# Patient Record
Sex: FEMALE | HISPANIC OR LATINO | URBAN - NONMETROPOLITAN AREA
[De-identification: names, ages, dates, MRNs, and addresses within clinical notes are randomized per-mention and may not be internally consistent; named-entity substitution may affect disease eponyms.]

---

## 2019-01-11 ENCOUNTER — OFFICE VISIT (OUTPATIENT)
Dept: URBAN - NONMETROPOLITAN AREA CLINIC 18 | Facility: CLINIC | Age: 39
End: 2019-01-11
Payer: COMMERCIAL

## 2019-01-11 PROCEDURE — 92014 COMPRE OPH EXAM EST PT 1/>: CPT | Performed by: OPTOMETRIST

## 2019-01-11 PROCEDURE — 92015 DETERMINE REFRACTIVE STATE: CPT | Performed by: OPTOMETRIST

## 2019-01-11 ASSESSMENT — VISUAL ACUITY
OD: 20/20
OS: 20/20

## 2019-01-11 ASSESSMENT — INTRAOCULAR PRESSURE
OD: 13
OS: 13

## 2019-01-11 NOTE — IMPRESSION/PLAN
Impression: Diagnosis: Round hole, bilateral. Code: O17.261. Plan: Holes in lattice observed. Multiple holes in a line observed OS, floaters in vision, rare photopsia reported. Stable retina observed today. Limited symptoms, longstanding. Education of signs and symptoms of retinal detachment reviewed. RTC immediately if experienced. Patient interested in referral, RTC to Dr. Remington Alejandro for potential laser treatment.

## 2019-01-11 NOTE — IMPRESSION/PLAN
Impression: Lattice degeneration of retina, bilateral: H35.413. Plan: A thorough review of the ocular findings was discussed. Signs and symptoms of retinal detachment reviewed in detail, rtc immediately if experienced.

## 2019-04-23 ENCOUNTER — OFFICE VISIT (OUTPATIENT)
Dept: URBAN - METROPOLITAN AREA CLINIC 88 | Facility: CLINIC | Age: 39
End: 2019-04-23
Payer: MEDICAID

## 2019-04-23 DIAGNOSIS — H35.411 LATTICE DEGENERATION OF RETINA, RIGHT EYE: Primary | ICD-10-CM

## 2019-04-23 PROCEDURE — 67145 PROPH RTA DTCHMNT PC: CPT | Performed by: OPHTHALMOLOGY

## 2019-04-23 ASSESSMENT — INTRAOCULAR PRESSURE
OD: 18
OS: 18

## 2019-05-15 ENCOUNTER — OFFICE VISIT (OUTPATIENT)
Dept: URBAN - METROPOLITAN AREA CLINIC 88 | Facility: CLINIC | Age: 39
End: 2019-05-15
Payer: MEDICAID

## 2019-05-15 DIAGNOSIS — H35.412 LATTICE DEGENERATION OF RETINA, LEFT EYE: Primary | ICD-10-CM

## 2019-05-15 PROCEDURE — 67145 PROPH RTA DTCHMNT PC: CPT | Performed by: OPHTHALMOLOGY

## 2019-05-15 ASSESSMENT — INTRAOCULAR PRESSURE
OD: 13
OS: 13

## 2019-07-22 ENCOUNTER — OFFICE VISIT (OUTPATIENT)
Dept: URBAN - METROPOLITAN AREA CLINIC 88 | Facility: CLINIC | Age: 39
End: 2019-07-22
Payer: MEDICAID

## 2019-07-22 DIAGNOSIS — H35.413 LATTICE DEGENERATION OF RETINA, BILATERAL: Primary | ICD-10-CM

## 2019-07-22 PROCEDURE — 99024 POSTOP FOLLOW-UP VISIT: CPT | Performed by: OPHTHALMOLOGY

## 2019-07-22 PROCEDURE — 99213 OFFICE O/P EST LOW 20 MIN: CPT | Performed by: OPHTHALMOLOGY

## 2019-07-22 ASSESSMENT — INTRAOCULAR PRESSURE
OS: 17
OD: 17

## 2019-07-22 NOTE — IMPRESSION/PLAN
Impression: Lattice degeneration of retina, bilateral: H35.413. Plan: Advised patient of condition. Discussed signs and symptoms of PVD/floaters.

## 2019-07-22 NOTE — IMPRESSION/PLAN
Impression: Round hole, bilateral: H33.323 OU. Condition: unstable. Plan: Discussed diagnosis in detail with patient. S/P Retina Laser OU. I would recommend Laser treatment ,risks and benefits were discussed, explained and understood by patient. Patient would like to proceed with recommendation.

## 2019-08-05 ENCOUNTER — PROCEDURE (OUTPATIENT)
Dept: URBAN - METROPOLITAN AREA CLINIC 88 | Facility: CLINIC | Age: 39
End: 2019-08-05
Payer: MEDICAID

## 2019-08-05 DIAGNOSIS — H33.323 ROUND HOLE, BILATERAL: Primary | ICD-10-CM

## 2019-08-05 PROCEDURE — 67228 TREATMENT X10SV RETINOPATHY: CPT | Performed by: OPHTHALMOLOGY

## 2019-08-05 ASSESSMENT — INTRAOCULAR PRESSURE
OD: 16
OS: 16

## 2019-10-01 ENCOUNTER — OFFICE VISIT (OUTPATIENT)
Dept: URBAN - METROPOLITAN AREA CLINIC 88 | Facility: CLINIC | Age: 39
End: 2019-10-01
Payer: MEDICAID

## 2019-10-01 DIAGNOSIS — H43.393 OTHER VITREOUS OPACITIES, BILATERAL: Primary | ICD-10-CM

## 2019-10-01 DIAGNOSIS — H10.413 CONJUNCTIVITIS - ALLERGIC: ICD-10-CM

## 2019-10-01 PROCEDURE — 99214 OFFICE O/P EST MOD 30 MIN: CPT | Performed by: OPHTHALMOLOGY

## 2019-10-01 ASSESSMENT — INTRAOCULAR PRESSURE
OD: 13
OS: 14

## 2019-10-01 NOTE — IMPRESSION/PLAN
Impression: Conjunctivitis - Allergic: H10.413. Plan: Discussed diagnosis in detail with patient. Patient is taking Claritin for her allergies. Patient instructed to apply cool compresses.

## 2019-10-01 NOTE — IMPRESSION/PLAN
Impression: Other vitreous opacities, bilateral: H43.393. Condition: established, stable. Plan: S/P Laser tx 08/05/19 OU. Discussed signs and symptoms of PVD/floaters. Patient instructed to call the office immediately if any symptoms noted.

## 2022-03-16 ENCOUNTER — OFFICE VISIT (OUTPATIENT)
Dept: URBAN - NONMETROPOLITAN AREA CLINIC 18 | Facility: CLINIC | Age: 42
End: 2022-03-16
Payer: COMMERCIAL

## 2022-03-16 DIAGNOSIS — H33.321 ROUND HOLE, RIGHT EYE: ICD-10-CM

## 2022-03-16 DIAGNOSIS — H52.13 MYOPIA, BILATERAL: Primary | ICD-10-CM

## 2022-03-16 DIAGNOSIS — H25.13 AGE-RELATED NUCLEAR CATARACT, BILATERAL: ICD-10-CM

## 2022-03-16 PROCEDURE — 92004 COMPRE OPH EXAM NEW PT 1/>: CPT | Performed by: OPTOMETRIST

## 2022-03-16 ASSESSMENT — VISUAL ACUITY
OS: 20/20
OD: 20/20

## 2022-03-16 ASSESSMENT — INTRAOCULAR PRESSURE
OS: 14
OD: 14

## 2022-03-16 NOTE — IMPRESSION/PLAN
Impression: Round hole, right eye: H33.321. Plan: Multiple holes with laser surround. 1 new hole temp OD. Education of signs and symptoms of retinal detachment reviewed. RTC immediately if experienced. Will refer to Dr. Iain Montemayor for additional treatment.

## 2022-03-16 NOTE — IMPRESSION/PLAN
Impression: Myopia, bilateral: H52.13. Plan: Reviewed refractive prescription in detail with patient and ways glasses can be used  to improve vision. Release spectacle prescription at this time.

## 2022-03-30 ENCOUNTER — OFFICE VISIT (OUTPATIENT)
Dept: URBAN - METROPOLITAN AREA CLINIC 88 | Facility: CLINIC | Age: 42
End: 2022-03-30
Payer: MEDICAID

## 2022-03-30 PROCEDURE — 67228 TREATMENT X10SV RETINOPATHY: CPT | Performed by: OPHTHALMOLOGY

## 2022-03-30 PROCEDURE — 92012 INTRM OPH EXAM EST PATIENT: CPT | Performed by: OPHTHALMOLOGY

## 2022-03-30 ASSESSMENT — INTRAOCULAR PRESSURE
OS: 15
OD: 14

## 2022-03-30 NOTE — IMPRESSION/PLAN
Impression: Round hole, right eye: H33.321. Plan: All signs/symptoms and risks of retinal detachment and tears were discussed in detail. Recommend Laser OD today due to patient lives out of town. Risks/benefits were discussed. I do recommend patient have a follow up at [facility] office closer to patient.

## 2022-05-24 ENCOUNTER — OFFICE VISIT (OUTPATIENT)
Dept: URBAN - NONMETROPOLITAN AREA CLINIC 18 | Facility: CLINIC | Age: 42
End: 2022-05-24
Payer: MEDICAID

## 2022-05-24 DIAGNOSIS — H25.13 AGE-RELATED NUCLEAR CATARACT, BILATERAL: ICD-10-CM

## 2022-05-24 DIAGNOSIS — H33.321 ROUND HOLE, RIGHT EYE: ICD-10-CM

## 2022-05-24 DIAGNOSIS — H43.393 OTHER VITREOUS OPACITIES, BILATERAL: ICD-10-CM

## 2022-05-24 DIAGNOSIS — H10.413 CONJUNCTIVITIS - ALLERGIC: Primary | ICD-10-CM

## 2022-05-24 DIAGNOSIS — H33.323 ROUND HOLE, BILATERAL: ICD-10-CM

## 2022-05-24 PROCEDURE — 99213 OFFICE O/P EST LOW 20 MIN: CPT | Performed by: OPTOMETRIST

## 2022-05-24 ASSESSMENT — INTRAOCULAR PRESSURE
OD: 15
OS: 15

## 2022-05-24 NOTE — IMPRESSION/PLAN
Impression: Round hole, right eye: H33.321. Plan: S/P laser tx. Monitor. All signs/symptoms and risks of retinal detachment and tears were discussed in detail. Patient instructed to call office immediately if any symptoms noted.

## 2022-08-31 ENCOUNTER — OFFICE VISIT (OUTPATIENT)
Dept: URBAN - NONMETROPOLITAN AREA CLINIC 18 | Facility: CLINIC | Age: 42
End: 2022-08-31

## 2022-08-31 DIAGNOSIS — H52.13 MYOPIA, BILATERAL: Primary | ICD-10-CM

## 2022-08-31 PROCEDURE — 92310 CONTACT LENS FITTING OU: CPT | Performed by: OPTOMETRIST

## 2022-09-15 ENCOUNTER — OFFICE VISIT (OUTPATIENT)
Dept: URBAN - NONMETROPOLITAN AREA CLINIC 18 | Facility: CLINIC | Age: 42
End: 2022-09-15

## 2022-09-15 DIAGNOSIS — H52.13 MYOPIA, BILATERAL: Primary | ICD-10-CM

## 2022-09-15 PROCEDURE — 92310 CONTACT LENS FITTING OU: CPT | Performed by: OPTOMETRIST

## 2022-10-13 ENCOUNTER — OFFICE VISIT (OUTPATIENT)
Dept: URBAN - NONMETROPOLITAN AREA CLINIC 18 | Facility: CLINIC | Age: 42
End: 2022-10-13
Payer: MEDICAID

## 2022-10-13 DIAGNOSIS — H18.829 CORNEAL DISORDER DUE TO CONTACT LENS: Primary | ICD-10-CM

## 2022-10-13 PROCEDURE — 99213 OFFICE O/P EST LOW 20 MIN: CPT | Performed by: OPTOMETRIST

## 2022-10-13 RX ORDER — PREDNISOLONE ACETATE 10 MG/ML
1 % SUSPENSION/ DROPS OPHTHALMIC
Qty: 5 | Refills: 0 | Status: ACTIVE
Start: 2022-10-13

## 2022-10-13 ASSESSMENT — INTRAOCULAR PRESSURE
OD: 10
OS: 10

## 2022-10-13 NOTE — IMPRESSION/PLAN
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 Plan: Discussed status with patient. Will start Pred 1% BID OU x 1 week then QD OU x 1 week. Cool compresses and d/c cls x 1 week. Switch cl solution to clear care.

## 2022-12-22 ENCOUNTER — OFFICE VISIT (OUTPATIENT)
Dept: URBAN - NONMETROPOLITAN AREA CLINIC 18 | Facility: CLINIC | Age: 42
End: 2022-12-22
Payer: MEDICAID

## 2022-12-22 DIAGNOSIS — H10.413 CONJUNCTIVITIS - ALLERGIC: Primary | ICD-10-CM

## 2022-12-22 PROCEDURE — 99213 OFFICE O/P EST LOW 20 MIN: CPT | Performed by: OPTOMETRIST

## 2022-12-22 RX ORDER — FLUOROMETHOLONE 2.5 MG/ML
0.25 % SUSPENSION/ DROPS OPHTHALMIC
Qty: 5 | Refills: 0 | Status: ACTIVE
Start: 2022-12-22

## 2022-12-22 NOTE — IMPRESSION/PLAN
Impression: Conjunctivitis - Allergic: H10.413. Plan: Patient educated that symptoms of ocular irritation are likely caused by allergies. Recommend igtt Pataday QD OU and cool compresses to improve symptoms. Will rx FML BID OU x 1 week, QD OU x 1 week. Preservative Free Optive deja 3 or Systane Complete TID-QID OU. Patient understands that medication will not cure allergies and is useful in reducing symptoms. RTC if symptoms do not improve or if new symptoms are experienced.

## 2023-03-28 ENCOUNTER — OFFICE VISIT (OUTPATIENT)
Dept: URBAN - NONMETROPOLITAN AREA CLINIC 18 | Facility: CLINIC | Age: 43
End: 2023-03-28
Payer: MEDICAID

## 2023-03-28 DIAGNOSIS — H10.413 CONJUNCTIVITIS - ALLERGIC: Primary | ICD-10-CM

## 2023-03-28 DIAGNOSIS — H18.829 CORNEAL DISORDER DUE TO CONTACT LENS: ICD-10-CM

## 2023-03-28 PROCEDURE — 99213 OFFICE O/P EST LOW 20 MIN: CPT | Performed by: OPTOMETRIST

## 2023-03-28 NOTE — IMPRESSION/PLAN
Impression: Conjunctivitis - Allergic: H10.413. Plan: Improved status. Recommend pt continue igtt Pataday QD OU, gel QHS OU and cool compresses to improve symptoms. Will rx FML BID OU x 1 week, QD OU x 1 week. Preservative Free Optive deja 3 or Systane Complete TID-QID OU. Patient understands that medication will not cure allergies and is useful in reducing symptoms. RTC if symptoms do not improve or if new symptoms are experienced.

## 2023-10-03 ENCOUNTER — OFFICE VISIT (OUTPATIENT)
Dept: URBAN - NONMETROPOLITAN AREA CLINIC 18 | Facility: CLINIC | Age: 43
End: 2023-10-03
Payer: COMMERCIAL

## 2023-10-03 DIAGNOSIS — H52.13 MYOPIA, BILATERAL: Primary | ICD-10-CM

## 2023-10-03 DIAGNOSIS — H43.393 OTHER VITREOUS OPACITIES, BILATERAL: ICD-10-CM

## 2023-10-03 DIAGNOSIS — H33.321 ROUND HOLE, RIGHT EYE: ICD-10-CM

## 2023-10-03 PROCEDURE — 92014 COMPRE OPH EXAM EST PT 1/>: CPT | Performed by: OPTOMETRIST

## 2023-10-03 ASSESSMENT — INTRAOCULAR PRESSURE
OD: 15
OS: 15